# Patient Record
(demographics unavailable — no encounter records)

---

## 2025-05-08 NOTE — HISTORY OF PRESENT ILLNESS
[FreeTextEntry1] : foot pain knee pain gained weight back pain [de-identified] : has a lot of issues spinal stenosis now wt gain diet related things are worse foot pain numbness    knee pain tried going to gym knees hurt feet hurt

## 2025-06-18 NOTE — ADDENDUM
[FreeTextEntry1] : I, SUMIT SHI, acted solely as a scribe for Dr. Andrew Cruz on this date 06/18/2025.  All medical record entries made by the Scribe were at my, Dr. Andrew Cruz, direction and personally dictated by me on 06/18/2025. I have reviewed the chart and agree that the record accurately reflects my personal performance of the history, physical exam, assessment and plan. I have also personally directed, reviewed, and agreed with the chart.

## 2025-06-18 NOTE — PHYSICAL EXAM
[de-identified] : Constitutional o Appearance : well-nourished, well developed, alert, in no acute distress  Head and Face o Head :  Inspection : atraumatic, normocephalic o Face :  Inspection : no visible rash or discoloration Respiratory o Respiratory Effort: breathing unlabored  Neurologic o Mental Status Examination :  Orientation : alert and oriented X 3 Psychiatric o Mood and Affect: mood normal, affect appropriate Cardiovascular o Observation/Palpation : - no swelling Lymphatic o Additional Nodes : No palpable lymph nodes present  Lum bosacral Spine o Inspection : no visible rash or discoloration o Palpation :  left paralumbar discomfort,  o Range of Motion :  side bending to the left causes mild discomfort,  o Muscle Strength : paraspinal muscle strength and tone within normal limits o Muscle Tone : paraspinal muscle strength and tone within normal limits o Tests: straight leg test negative bilaterally, KEVIN test negative bilaterally   Right Lower Extremity o Buttock : no tenderness, swelling or deformities  o Right Hip :  Inspection/Palpation : no tenderness, swelling or deformities  Range of Motion : full and painless in all planes, no crepitance  Stability : joint stability intact  Strength : extension, flexion, adduction, abduction, internal rotation and external rotation 5/5   o Right Knee :  Inspection/Palpation : no medial compartment tenderness or lateral tenderness to palpation, no swelling, small abrasion clean and dry   Range of Motion : 0-120 active flexion and extension full and painless, no crepitance, good patellofemoral glide   Stability : no valgus or varus instability present on provocative testing  Strength : flexion and extension 5/5  Tests and Signs : negative Anterior Drawer, negative Lachman, negative Charity  Left Lower Extremity o Buttock : no tenderness, swelling or deformities  o Left Hip :  Inspection/Palpation : no tenderness, no swelling or deformities  Range of Motion : full and painless in all planes, no crepitance  Stability : joint stability intact  Strength : extension, flexion, adduction, abduction, internal rotation and external rotation 4+/5  o Left Knee :  Inspection/Palpation : no medial compartment tenderness or lateral tenderness to palpation, no swelling  Range of Motion : 0-120 active flexion and extension full and painless, no crepitance  Stability : no valgus or varus instability present on provocative testing  Strength : flexion and extension 4+/5  Tests and Signs : negative Anterior Drawer, negative Lachman, negative Charity  Gait and Station: Gait: gait normal, varus deformity of right side worse than left, stiff choppy steps, no foot drop, no significant extremity swelling or lymphedema, good proprioception and balance, hamstrings tight right and left, good core strength,   o Knee injection : Indication- knee osteoarthritis, Anatomic location- right intra-articular joint space, Spray - area was sterilized with Betadine and alcohol and anesthetized with Ethyl Chloride , needle used-20G, Medications given- 100mg lidocaine, 20mg kenalog, 5mg dexamethasone. The patient tolerated the procedure well.

## 2025-06-18 NOTE — CONSULT LETTER
[Dear  ___] : Dear  [unfilled], [Consult Letter:] : I had the pleasure of evaluating your patient, [unfilled]. [Please see my note below.] : Please see my note below. [Consult Closing:] : Thank you very much for allowing me to participate in the care of this patient.  If you have any questions, please do not hesitate to contact me. [Sincerely,] : Sincerely, [FreeTextEntry2] : JOHN REYES  [FreeTextEntry3] : Andrew Cruz M.D.

## 2025-06-18 NOTE — HISTORY OF PRESENT ILLNESS
[de-identified] : 77 year old male presents complaining of lower back and bilateral knee pain, right worse than left. He has been going to physical therapy and notes at times he feels better but it is temporary relief. He notes the knees are worse than his lower back pain. He has difficulty kneeling, squatting, and standing up from a seated position. He has trouble walking on hilly surfaces. He has noticed swelling at times. He notes the right knee pain is bad enough for a cortisone injection. Of note, his wife passed away about 18 months ago. He also gained a significant amount of weight after she passed.     Radiology Results 5/7/2025  Lumbosacral Spine: AP and lateral views were obtained and reveal degenerative disc disease anterior osteophytes foraminal stenosis at L4-5 and L5-S1 no compression fractures  Pelvis: AP pelvis was obtained and revealed moderate to severe arthritis both hips right worse than left Right Knee: Standing AP, lateral, tunnel and skyline views were obtained and revealed severe medial and moderate patellofemoral arthritis  Left Knee: Standing AP, lateral, tunnel and skyline views were obtained and revealed moderate medial and patellofemoral arthritis

## 2025-06-18 NOTE — DISCUSSION/SUMMARY
[de-identified] : I went over the pathophysiology of the patient's symptoms in great detail with the patient. The patient elected to receive a cortisone injection into his right knee today, and tolerated it well. I instructed the patient on ROM exercises, and told them to take it easy. The use of ice and rest was reviewed with the patient. The patient may resume activities tomorrow. Viscosupplementation was discussed as a solution to the patient's symptoms, and we are requesting Monovisc for right knee. I am recommending the patient continues to go to physical therapy to obtain increases in their strength and mobility. A prescription was provided.   All of their questions were answered. They understand and consent to the plan.   FU after authorization.

## 2025-07-30 NOTE — HISTORY OF PRESENT ILLNESS
[de-identified] : 77 year old male presents for follow up of lower back and bilateral knee pain, right worse than left. He received a cortisone injection in his right knee on 06/18/25, and reports immense relief from his pain symptoms. He has been going to physical therapy and notes at times he feels better but it is temporary relief. He notes the knees are worse than his lower back pain. He had difficulty kneeling, squatting, and standing up from a seated position in the past. He has trouble walking on hilly surfaces. He has noticed swelling at times. He notes the right knee pain is bad enough for a cortisone injection. Of note, his wife passed away about 18 months ago. He also gained a significant amount of weight after she passed.   He is currently continuing to lose weight  Radiology Results 5/7/2025  Lumbosacral Spine: AP and lateral views were obtained and reveal degenerative disc disease anterior osteophytes foraminal stenosis at L4-5 and L5-S1 no compression fractures  Pelvis: AP pelvis was obtained and revealed moderate to severe arthritis both hips right worse than left Right Knee: Standing AP, lateral, tunnel and skyline views were obtained and revealed severe medial and moderate patellofemoral arthritis  Left Knee: Standing AP, lateral, tunnel and skyline views were obtained and revealed moderate medial and patellofemoral arthritis

## 2025-07-30 NOTE — PHYSICAL EXAM
[de-identified] : Constitutional o Appearance : well-nourished, well developed, alert, in no acute distress  Head and Face o Head :  Inspection : atraumatic, normocephalic o Face :  Inspection : no visible rash or discoloration Respiratory o Respiratory Effort: breathing unlabored  Neurologic o Mental Status Examination :  Orientation : alert and oriented X 3 Psychiatric o Mood and Affect: mood normal, affect appropriate Cardiovascular o Observation/Palpation : - no swelling Lymphatic o Additional Nodes : No palpable lymph nodes present  Lum bosacral Spine o Inspection : no visible rash or discoloration o Palpation :  left paralumbar discomfort,  o Range of Motion :  side bending to the left causes mild discomfort,  o Muscle Strength : paraspinal muscle strength and tone within normal limits o Muscle Tone : paraspinal muscle strength and tone within normal limits o Tests: straight leg test negative bilaterally, KEVIN test negative bilaterally   Right Lower Extremity o Buttock : no tenderness, swelling or deformities  o Right Hip :  Inspection/Palpation : no tenderness, swelling or deformities  Range of Motion : full and painless in all planes, no crepitance  Stability : joint stability intact  Strength : extension, flexion, adduction, abduction, internal rotation and external rotation 5/5   o Right Knee :  Inspection/Palpation : no medial compartment tenderness or lateral tenderness to palpation, no swelling, small abrasion clean and dry   Range of Motion : 0-125 active flexion and extension full and painless, no crepitance, good patellofemoral glide   Stability : mild varus valgus deformity present on provocative testing  Strength : flexion and extension 5/5  Tests and Signs : negative Anterior Drawer, negative Lachman, negative Charity  Left Lower Extremity o Buttock : no tenderness, swelling or deformities  o Left Hip :  Inspection/Palpation : no tenderness, no swelling or deformities  Range of Motion : full and painless in all planes, no crepitance  Stability : joint stability intact  Strength : extension, flexion, adduction, abduction, internal rotation and external rotation 4+/5  o Left Knee :  Inspection/Palpation : no medial compartment tenderness or lateral tenderness to palpation, no swelling  Range of Motion : 0-120 active flexion and extension full and painless, no crepitance  Stability : no valgus or varus instability present on provocative testing  Strength : flexion and extension 4+/5  Tests and Signs : negative Anterior Drawer, negative Lachman, negative Charity  Gait and Station: Gait: gait normal, varus deformity of right side worse than left, stiff choppy steps, no foot drop, no significant extremity swelling or lymphedema, good proprioception and balance, hamstrings tight right and left, good core strength,   o Knee injection : Indication- knee osteoarthritis, Anatomic location- right intra-articular joint space, Spray - area was sterilized with Betadine and alcohol and anesthetized with Ethyl Chloride , needle used-20G, Medications given- 3cc's Durolane under Ultrasound guidance. The patient tolerated the procedure well.

## 2025-07-30 NOTE — HISTORY OF PRESENT ILLNESS
[de-identified] : 77 year old male presents for follow up of lower back and bilateral knee pain, right worse than left. He received a cortisone injection in his right knee on 06/18/25, and reports immense relief from his pain symptoms. He has been going to physical therapy and notes at times he feels better but it is temporary relief. He notes the knees are worse than his lower back pain. He had difficulty kneeling, squatting, and standing up from a seated position in the past. He has trouble walking on hilly surfaces. He has noticed swelling at times. He notes the right knee pain is bad enough for a cortisone injection. Of note, his wife passed away about 18 months ago. He also gained a significant amount of weight after she passed.   He is currently continuing to lose weight  Radiology Results 5/7/2025  Lumbosacral Spine: AP and lateral views were obtained and reveal degenerative disc disease anterior osteophytes foraminal stenosis at L4-5 and L5-S1 no compression fractures  Pelvis: AP pelvis was obtained and revealed moderate to severe arthritis both hips right worse than left Right Knee: Standing AP, lateral, tunnel and skyline views were obtained and revealed severe medial and moderate patellofemoral arthritis  Left Knee: Standing AP, lateral, tunnel and skyline views were obtained and revealed moderate medial and patellofemoral arthritis

## 2025-07-30 NOTE — ADDENDUM
[FreeTextEntry1] : I, SALLIE CONDESSEF, acted solely as a scribe for Dr. Andrew Cruz on this date 07/30/2025. All medical record entries made by the Scribe were at my, Dr. Andrew Cruz, direction and personally dictated by me on 07/30/2025. I have reviewed the chart and agree that the record accurately reflects my personal performance of the history, physical exam, assessment and plan. I have also personally directed, reviewed, and agreed with the chart.

## 2025-07-30 NOTE — DISCUSSION/SUMMARY
[de-identified] : I went over the pathophysiology of the patient's symptoms in great detail with the patient. The patient elected to receive a Durolane injection into his right knee today, and tolerated it well. I instructed the patient on ROM exercises, and told them to take it easy. The use of ice and rest was reviewed with the patient. The patient may resume activities tomorrow. I reminded the patient that it takes 4 to 6 weeks after the final injection to feel symptom relief. I instructed the patient on ROM exercises, and told them to take it easy. The use of ice and rest was reviewed with the patient.   All of their questions were answered. They understand and consent to the plan.   FU in 6 weeks

## 2025-07-30 NOTE — DISCUSSION/SUMMARY
[de-identified] : I went over the pathophysiology of the patient's symptoms in great detail with the patient. The patient elected to receive a Durolane injection into his right knee today, and tolerated it well. I instructed the patient on ROM exercises, and told them to take it easy. The use of ice and rest was reviewed with the patient. The patient may resume activities tomorrow. I reminded the patient that it takes 4 to 6 weeks after the final injection to feel symptom relief. I instructed the patient on ROM exercises, and told them to take it easy. The use of ice and rest was reviewed with the patient.   All of their questions were answered. They understand and consent to the plan.   FU in 6 weeks

## 2025-07-30 NOTE — REASON FOR VISIT
[Follow-Up Visit] : a follow-up visit for [Knee Pain] : knee pain [FreeTextEntry2] : lower back pain